# Patient Record
(demographics unavailable — no encounter records)

---

## 2024-11-18 NOTE — DISCUSSION/SUMMARY
[de-identified] : Cervical degenerative disc disease. Mild C4-6 stenosis. Mild-moderate left rotator cuff tendinosis. Bilateral rotator cuff pathology L>R. DM. Discussed all options. Referred to Dr. Bueno for bilateral shoulder evaluation. Discussed at length how Medrol can affect patient's blood levels. I offered an injection after all risks were explained including allergic reaction to an infection under sterile conditions 1 mg of Depo-Medrol and 2 cc of 1% Lidocaine without epinephrine was injected into the painful site. The patient tolerated the procedure well and received significant relief following the injection. (left rotator cuff) All options discussed including rest, medicine, home exercise, acupuncture, Chiropractic care, Physical Therapy, Pain management, and last resort surgery. All questions were answered, all alternatives discussed, and the patient is in complete agreement with the treatment plan which the patient contributed to and discussed with me through the shared decision-making process. Follow-up appointment as instructed. Any issues and the patient will call or come in sooner.

## 2024-11-18 NOTE — PHYSICAL EXAM
[Cane] : ambulates with cane [Tucker's Sign] : negative Tucker's sign [Pronator Drift] : negative pronator drift [SLR] : negative straight leg raise [de-identified] : 5 out of 5 motor strength, sensation is intact and symmetrical full range of motion flexion extension and rotation, no palpatory tenderness full range of motion of hips knees shoulders and elbows (all four extremities), no atrophy, negative straight leg raise, no pathological reflexes, no swelling, normal ambulation, no apparent distress skin is intact, no palpable lymph nodes, no upper or lower extremity instability, alert and oriented x3 and normal mood. Normal finger-to nose test. No upper motor neuron findings. +bilateral Hawkin's +bilateral Neer's +bilateral Drop arm test [de-identified] : MRI LEFT SHOULDER WITHOUT CONTRAST   10/11/24   (R)   HISTORY: Shoulder pain.  TECHNIQUE:  Multiplanar, multi-sequence MRI of the left shoulder was obtained on a 3T scanner according to standard protocol without intravenous or intra-articular contrast.   COMPARISON:  No previous studies are available for comparison.  FINDINGS:    Osseous structures: No fracture or osteonecrosis.  Rotator cuff:  There is moderate supraspinatus tendinosis. There is mild to moderate infraspinatus tendinosis. There is moderate subscapularis tendinosis. There is an 8 x 7 mm partial thickness articular surface tear spanning the junction of the distal supraspinatus and subscapularis tendons, involving up to 50% of the overall tendon width. Normal teres minor tendon without tendinosis or tear. Normal rotator cuff muscles without edema or atrophy. No subacromial-subdeltoid bursitis.   Long bicipital tendon:  No tendinosis or tear. Located within bicipital groove without subluxation or dislocation. Mild biceps tenosynovitis within the bicipital groove. There is edema within the rotator interval.  Glenohumeral joint:  There is mild glenohumeral joint arthropathy. Findings are manifested by partial thickness cartilage loss with minimal reactive subcortical cystic change involving the mid glenoid fossa, and minimal marginal osteophyte formation. Complex tear of the superior labrum extending from anterior to posterior. No joint effusion or synovitis.  Acromioclavicular joint and rotator cuff outlet:  There is mild to moderate acromioclavicular joint arthropathy. The acromion is flat. No acromial spur.   Other:  Unremarkable quadrilateral and axillary spaces.   IMPRESSION:  MRI of the left shoulder demonstrates:  1.  Mild to moderate rotator cuff tendinosis with a partial-thickness articular surface tear spanning the junction of the distal supraspinatus and subscapularis tendons involving up to 50% of the overall tendon width. 2.  Synovitis in the region of the rotator cuff interval. 3.  Mild biceps tenosynovitis within the bicipital groove. 4.  Complex tear of the superior labrum extending from anterior to posterior. 5.  Mild glenohumeral and mild-to-moderate acromioclavicular arthropathy.    MRI CERVICAL SPINE WITHOUT CONTRAST    6/27/24  (R)   HISTORY:  Neck pain.  TECHNIQUE:  Multiplanar, multi-sequential MRI of the cervical spine was obtained on a 3T scanner using a standard protocol.  COMPARISON:  Radiographs 2/16/2024.  FINDINGS:   OSSEOUS STRUCTURES: Degenerative Schmorl's nodes are noted at C3-4 and C5-6. Vertebral body heights are preserved. There is a probable hemangioma within the C4 vertebral body.  ALIGNMENT: There is straightening of the cervical lordosis.  No significant scoliosis. No spondylolisthesis.  SPINAL CORD: Normal signal.  POSTERIOR FOSSA/CERVICOMEDULLARY JUNCTION: There is partially imaged chronic infarct involving the right cerebellar hemisphere.  NECK/PARASPINAL SOFT TISSUES: Unremarkable.  INCLUDED THORACIC SPINE: Unremarkable.  DISCS: There is multilevel disc desiccation. There is mild to moderate disc space narrowing at C3-4, C4-5, and C5-6  The following axial levels are imaged and detailed below:  C2-C3: No disc bulging or herniation. No spinal canal or foraminal stenosis.  C3-C4: There is mild to moderate diffuse disc/osteophyte and right worse than left facet arthrosis contributing to mild ventral cord impingement. Uncovertebral joint hypertrophy further contributes to moderate right foraminal stenosis.  C4-C5: There is mild to moderate disc/osteophyte and facet arthrosis contributing to mild spinal canal stenosis/ventral cord impingement. There is uncovertebral joint hypertrophy further contributing to moderate right and mild left foraminal stenosis.  C5-C6: There is mild to moderate disc/osteophyte and mild facet arthrosis contributing to mild spinal canal stenosis/cord impingement. There is uncovertebral joint hypertrophy further contributing to moderate left foraminal stenosis.  C6-C7: There is mild to moderate disc/osteophyte and mild facet arthrosis contributing to mild ventral thecal sac/cord impingement. There is uncovertebral joint hypertrophy further contributing to mild right foraminal stenosis.  C7-T1: There is mild disc/osteophyte and facet arthrosis contributing to mild ventral thecal sac effacement. There is moderate right and mild left foraminal stenosis.  IMPRESSION:  MRI of the cervical spine demonstrates:  Multilevel degenerative changes of the cervical spine contributing to mild spinal canal stenosis/cord impingement at C4-5 and C5-6, and mild ventral cord impingement at C3-4 and C6-7.  Multilevel foraminal stenoses, as detailed.  Probable C4 vertebral hemangioma, which can be correlated with noncontrast cervical spine CT as clinically warranted.  Partially imaged chronic right cerebellar infarct.

## 2024-11-18 NOTE — ADDENDUM
[FreeTextEntry1] : This note was written by Joe Bernard on 11/18/2024 acting as scribe for Dr. Darnell Chen M.D.  I, Darnell Chen MD, have read and attest that all the information, medical decision making and discharge instructions within are true and accurate.

## 2024-11-18 NOTE — HISTORY OF PRESENT ILLNESS
[Stable] : stable [de-identified] : 81 year old female presents for evaluation of left arm pain x 1 year. She notes she was living at [facility] and had a fall in 2023, but is unsure if her pain is related to the fall.  She states that she has tremors of her left hand where she can not hold items, and drops them.  She notes that she has pain at the left face that extends to the neck and down the left arm to the fingers, with numbness/tingling of the fingers. She also complains of bilateral shoulder pain, L>R. She also states that her left arm and hand feels weak. She does not take medications for the pain. Was previously prescribed pregabalin for the pain. Can not take NSAIDs due to taking xarelto for Afib.  She tried PT for the neck and left shoulder from earlier this year to August but did not feel improvement. Denies KEILY.  Has MRI Cervical and MRI Left Shoulder.  PMHx: CVA in May 2023, HTN, DM, osteoporosis, Afib on xarelto, HLD  No fever chills sweats nausea vomiting no bowel or bladder dysfunction, no recent weight loss or gain no night pain. This history is in addition to the intake form that I personally reviewed.